# Patient Record
Sex: FEMALE
[De-identification: names, ages, dates, MRNs, and addresses within clinical notes are randomized per-mention and may not be internally consistent; named-entity substitution may affect disease eponyms.]

---

## 2019-08-29 ENCOUNTER — APPOINTMENT (OUTPATIENT)
Dept: CARDIOLOGY | Facility: CLINIC | Age: 74
End: 2019-08-29
Payer: MEDICARE

## 2019-08-29 ENCOUNTER — NON-APPOINTMENT (OUTPATIENT)
Age: 74
End: 2019-08-29

## 2019-08-29 VITALS
DIASTOLIC BLOOD PRESSURE: 79 MMHG | OXYGEN SATURATION: 90 % | BODY MASS INDEX: 25.84 KG/M2 | HEIGHT: 65.5 IN | SYSTOLIC BLOOD PRESSURE: 124 MMHG | WEIGHT: 157 LBS | HEART RATE: 75 BPM

## 2019-08-29 PROCEDURE — 93000 ELECTROCARDIOGRAM COMPLETE: CPT

## 2019-08-29 PROCEDURE — 99205 OFFICE O/P NEW HI 60 MIN: CPT

## 2019-08-29 RX ORDER — ROSUVASTATIN CALCIUM 20 MG/1
20 TABLET, FILM COATED ORAL
Refills: 0 | Status: ACTIVE | COMMUNITY

## 2019-08-29 NOTE — HISTORY OF PRESENT ILLNESS
[FreeTextEntry1] : Jayne presents to the office today for a cardiovascular evaluation. She was last seen in our office in 2015.\par \par She is now 73 years old with a history of hypothyroidism and aortic valve sclerosis. She was seen in our office in 2006 for the further evaluation of a cardiac murmur. It was felt to be relatively benign, and she was reassured. Echocardiography revealed no evidence of abnormality.  She was several times, most recently about 3 years ago because her cardiac murmur sounded more exaggerated.  She was found to have aortic valve sclerosis. No specific changes were made to her medical regimen at that time.\par \par Jayne remains physically active. She notes that with regular activities she usually feels well. With more unusual activities, such as walking stairs in her home, she will feel more winded. She believes that this has been happening more recently. She reports no anginal chest discomfort. She denies orthopnea, PND and edema. She denies palpitations, dizziness and syncope. She reports that her cholesterol has been suboptimally controlled.\par \par She recently presented for a primary care evaluation, and her cardiac murmur was felt to be much more significant. She was therefore referred back here for another evaluation.

## 2019-08-29 NOTE — PHYSICAL EXAM
[General Appearance - Well Developed] : well developed [Well Groomed] : well groomed [Normal Appearance] : normal appearance [General Appearance - Well Nourished] : well nourished [No Deformities] : no deformities [General Appearance - In No Acute Distress] : no acute distress [Normal Conjunctiva] : the conjunctiva exhibited no abnormalities [Eyelids - No Xanthelasma] : the eyelids demonstrated no xanthelasmas [Normal Oral Mucosa] : normal oral mucosa [No Oral Pallor] : no oral pallor [No Oral Cyanosis] : no oral cyanosis [Normal Jugular Venous A Waves Present] : normal jugular venous A waves present [Normal Jugular Venous V Waves Present] : normal jugular venous V waves present [No Jugular Venous Cabral A Waves] : no jugular venous cabral A waves [Exaggerated Use Of Accessory Muscles For Inspiration] : no accessory muscle use [Respiration, Rhythm And Depth] : normal respiratory rhythm and effort [Auscultation Breath Sounds / Voice Sounds] : lungs were clear to auscultation bilaterally [Abdomen Soft] : soft [Abdomen Tenderness] : non-tender [Abdomen Mass (___ Cm)] : no abdominal mass palpated [Abnormal Walk] : normal gait [Gait - Sufficient For Exercise Testing] : the gait was sufficient for exercise testing [Nail Clubbing] : no clubbing of the fingernails [Cyanosis, Localized] : no localized cyanosis [Petechial Hemorrhages (___cm)] : no petechial hemorrhages [Skin Color & Pigmentation] : normal skin color and pigmentation [No Venous Stasis] : no venous stasis [Skin Lesions] : no skin lesions [No Skin Ulcers] : no skin ulcer [No Xanthoma] : no  xanthoma was observed [Mood] : the mood was normal [Oriented To Time, Place, And Person] : oriented to person, place, and time [Affect] : the affect was normal [No Anxiety] : not feeling anxious [No Precordial Heave] : no precordial heave was noted [Normal] : normal [Normal Rate] : normal [Rhythm Regular] : regular [Normal S2] : normal S2 [Normal S1] : normal S1 [S3] : no S3 [No Gallop] : no gallop heard [S4] : no S4 [] : increases with valsalva [III] : a grade 3 [2+] : left 2+ [Right Carotid Bruit] : no bruit heard over the right carotid [Left Carotid Bruit] : no bruit heard over the left carotid [Right Femoral Bruit] : no bruit heard over the right femoral artery [Left Femoral Bruit] : no bruit heard over the left femoral artery [Bruit] : no bruit heard [1+] : left 1+ [No Pitting Edema] : no pitting edema present

## 2019-08-29 NOTE — REASON FOR VISIT
[Consultation] : a consultation regarding [Hyperlipidemia] : hyperlipidemia [Hypertension] : hypertension [FreeTextEntry1] : cardiac murmur

## 2019-08-29 NOTE — DISCUSSION/SUMMARY
[FreeTextEntry1] : Jayne has a history of hypertension and hypercholesterolemia which have been treated. She has long-standing hypothyroidism. She has a long-standing cardiac murmur,  on the basis of aortic valve disease.\par \par Her murmur does sound more significant. She certainly needs a followup echocardiogram. In addition, she has an abnormal EKG, which suggest the possibility of prior anterior infarction. Certainly, this could also be on the basis of her aortic valve disease, with left ventricular hypertrophy. She will start with an echocardiogram. If she does not have severe aortic stenosis, I would then suggest nuclear stress testing. I will be in contact with her to discuss the results.

## 2019-09-09 ENCOUNTER — APPOINTMENT (OUTPATIENT)
Dept: CARDIOLOGY | Facility: CLINIC | Age: 74
End: 2019-09-09
Payer: MEDICARE

## 2019-09-09 DIAGNOSIS — R94.31 ABNORMAL ELECTROCARDIOGRAM [ECG] [EKG]: ICD-10-CM

## 2019-09-09 PROCEDURE — 93306 TTE W/DOPPLER COMPLETE: CPT

## 2019-09-13 PROBLEM — R94.31 ABNORMAL EKG: Status: ACTIVE | Noted: 2019-09-13

## 2019-11-25 ENCOUNTER — APPOINTMENT (OUTPATIENT)
Dept: CARDIOLOGY | Facility: CLINIC | Age: 74
End: 2019-11-25
Payer: MEDICARE

## 2019-11-25 PROCEDURE — A9500: CPT

## 2019-11-25 PROCEDURE — 78452 HT MUSCLE IMAGE SPECT MULT: CPT

## 2019-11-25 PROCEDURE — 93015 CV STRESS TEST SUPVJ I&R: CPT
